# Patient Record
Sex: FEMALE | Race: OTHER
[De-identification: names, ages, dates, MRNs, and addresses within clinical notes are randomized per-mention and may not be internally consistent; named-entity substitution may affect disease eponyms.]

---

## 2020-08-26 ENCOUNTER — HOSPITAL ENCOUNTER (EMERGENCY)
Dept: HOSPITAL 8 - ED | Age: 70
LOS: 1 days | Discharge: HOME | End: 2020-08-27
Payer: MEDICARE

## 2020-08-26 VITALS — BODY MASS INDEX: 33.19 KG/M2 | WEIGHT: 180.34 LBS | HEIGHT: 62 IN

## 2020-08-26 VITALS — SYSTOLIC BLOOD PRESSURE: 173 MMHG | DIASTOLIC BLOOD PRESSURE: 156 MMHG

## 2020-08-26 DIAGNOSIS — R50.9: ICD-10-CM

## 2020-08-26 DIAGNOSIS — B34.9: Primary | ICD-10-CM

## 2020-08-26 DIAGNOSIS — Z20.828: ICD-10-CM

## 2020-08-26 DIAGNOSIS — Z87.891: ICD-10-CM

## 2020-08-26 PROCEDURE — 81003 URINALYSIS AUTO W/O SCOPE: CPT

## 2020-08-26 PROCEDURE — 99283 EMERGENCY DEPT VISIT LOW MDM: CPT

## 2020-08-26 PROCEDURE — 87635 SARS-COV-2 COVID-19 AMP PRB: CPT

## 2020-08-26 PROCEDURE — 36415 COLL VENOUS BLD VENIPUNCTURE: CPT

## 2020-08-27 LAB — MICROSCOPIC: (no result)

## 2020-08-27 NOTE — NUR
PT BEING SEEN BY RN AT THIS TIME. PT STATES SHE IS HAVING A HARD TIME URINATING 
AND THAT IT BURNS WHEN SHE PEES. PT NOW ALSO HAVING PAIN IN BACK OF HEAD. PT 
MEDICATED PER MAR FOR PAIN. PT HAS HX OF URINARY TRACT INFECTIONS. NAD, VSS, 
WCTM.



PT PLACED ON SPO2/BP MONITORING. CALL LIGHT ON LAP.

## 2020-08-27 NOTE — NUR
Patient given discharge instructions and they have confirmed that they 
understand the instructions.  Patient ambulatory with steady gait. DENIES 
ADDITIONAL QUESTIONS OR NEEDS AT THIS TIME. NAD, VSS, NO BELONGINGS LEFT IN 
ROOM AFTER DC. SON LEFT WITH MOM